# Patient Record
Sex: FEMALE | Race: WHITE | NOT HISPANIC OR LATINO | URBAN - METROPOLITAN AREA
[De-identification: names, ages, dates, MRNs, and addresses within clinical notes are randomized per-mention and may not be internally consistent; named-entity substitution may affect disease eponyms.]

---

## 2024-01-02 ENCOUNTER — OFFICE VISIT (OUTPATIENT)
Dept: URBAN - METROPOLITAN AREA CLINIC 54 | Facility: CLINIC | Age: 34
End: 2024-01-02
Payer: COMMERCIAL

## 2024-01-02 VITALS
BODY MASS INDEX: 27.83 KG/M2 | HEART RATE: 90 BPM | TEMPERATURE: 98.4 F | WEIGHT: 163 LBS | SYSTOLIC BLOOD PRESSURE: 114 MMHG | HEIGHT: 64 IN | DIASTOLIC BLOOD PRESSURE: 80 MMHG

## 2024-01-02 DIAGNOSIS — K70.10 ALCOHOLIC HEPATITIS WITHOUT ASCITES: ICD-10-CM

## 2024-01-02 DIAGNOSIS — R79.89 ELEVATED LFTS: ICD-10-CM

## 2024-01-02 PROBLEM — 235875008: Status: ACTIVE | Noted: 2024-01-02

## 2024-01-02 PROCEDURE — 99205 OFFICE O/P NEW HI 60 MIN: CPT | Performed by: PHYSICIAN ASSISTANT

## 2024-01-02 PROCEDURE — 99245 OFF/OP CONSLTJ NEW/EST HI 55: CPT | Performed by: PHYSICIAN ASSISTANT

## 2024-01-02 RX ORDER — FOLIC ACID 1 MG/1
1 TABLET TABLET ORAL ONCE A DAY
Status: ACTIVE | COMMUNITY

## 2024-01-02 RX ORDER — SERTRALINE 50 MG/1
1 TABLET TABLET, FILM COATED ORAL ONCE A DAY
Status: ACTIVE | COMMUNITY

## 2024-01-02 RX ORDER — GABAPENTIN 300 MG/1
1 CAPSULE CAPSULE ORAL ONCE A DAY
Status: ACTIVE | COMMUNITY

## 2024-01-02 NOTE — PHYSICAL EXAM GASTROINTESTINAL
Abdomen , soft, nontender, nondistended , no guarding or rigidity , no masses palpable ,  enlarged liver

## 2024-01-02 NOTE — HPI-TODAY'S VISIT:
Mayra Wolfe is a 33-year-old female with PMH of who was referred to clinic by JEROME Dickey for elevated LFTs.  A copy of this document will be sent to the referring provider.  Patient was noted to have elevated LFTs on 10/26/2023 when she was seen at urgent care and was referred to the ED.  She was kept in [facility] for call detox. LFTs at that time were , ALT 49, T. bili 1.90, alk phos 152.  Other labs included low folate.  RUQ US obtained on 12/20/2023 heterogeneous and coarsened liver parenchyma which may represent underlying chronic liver disease.  No intra or extrahepatic ductal dilation.  No evidence of acute cholecystitis. LFTs were not repeated during hospitalization or at follow-up.  Patient was previously drinking of a 750 mL bottle daily and has reportedly cut back to 10 oz daily and has been decreasing amount by 2 oz each week. Hepatitis panel was negative. No previous EGD or colonoscopy. Folate was recently noted to be low and on supplementation. Normal B12. She reports she was initially seen in the hospital for paresthesias. Follows with Dr Hobson who is considering GBS as a cause for neuropathy as there was a prodrome illness.

## 2024-01-03 LAB
A/G RATIO: 0.8
ABSOLUTE BASOPHILS: 50
ABSOLUTE EOSINOPHILS: 108
ABSOLUTE LYMPHOCYTES: 2117
ABSOLUTE MONOCYTES: 540
ABSOLUTE NEUTROPHILS: 5486
ALBUMIN: 3.4
ALKALINE PHOSPHATASE: 152
ALT (SGPT): 52
AST (SGOT): 239
BASOPHILS: 0.6
BILIRUBIN, TOTAL: 2.7
BUN/CREATININE RATIO: 14
BUN: 5
CALCIUM: 8.9
CARBON DIOXIDE, TOTAL: 26
CHLORIDE: 100
CREATININE: 0.37
EGFR: 136
EOSINOPHILS: 1.3
GLOBULIN, TOTAL: 4.3
GLUCOSE: 95
HEMATOCRIT: 38.3
HEMOGLOBIN: 13.9
INR: 1.5
LYMPHOCYTES: 25.5
MCH: 33
MCHC: 36.3
MCV: 91
MONOCYTES: 6.5
MPV: 12.2
NEUTROPHILS: 66.1
PLATELET COUNT: 114
POTASSIUM: 3.6
PROTEIN, TOTAL: 7.7
PT: 15.8
RDW: 13.1
RED BLOOD CELL COUNT: 4.21
SODIUM: 138
WHITE BLOOD CELL COUNT: 8.3

## 2024-03-06 ENCOUNTER — OV EP (OUTPATIENT)
Dept: URBAN - METROPOLITAN AREA CLINIC 54 | Facility: CLINIC | Age: 34
End: 2024-03-06

## 2024-03-06 RX ORDER — SERTRALINE 50 MG/1
1 TABLET TABLET, FILM COATED ORAL ONCE A DAY
COMMUNITY

## 2024-03-06 RX ORDER — FOLIC ACID 1 MG/1
1 TABLET TABLET ORAL ONCE A DAY
COMMUNITY

## 2024-03-06 RX ORDER — GABAPENTIN 300 MG/1
1 CAPSULE CAPSULE ORAL ONCE A DAY
COMMUNITY

## 2024-03-06 RX ORDER — PROPRANOLOL HYDROCHLORIDE 20 MG/1
1 TABLET TABLET ORAL ONCE A DAY
Status: ACTIVE | COMMUNITY

## 2024-03-06 RX ORDER — ESCITALOPRAM OXALATE 5 MG/1
1 TABLET TABLET, FILM COATED ORAL ONCE A DAY
Status: ACTIVE | COMMUNITY

## 2024-03-07 ENCOUNTER — OV EP (OUTPATIENT)
Dept: URBAN - METROPOLITAN AREA CLINIC 54 | Facility: CLINIC | Age: 34
End: 2024-03-07

## 2024-03-07 RX ORDER — SERTRALINE 50 MG/1
1 TABLET TABLET, FILM COATED ORAL ONCE A DAY
COMMUNITY

## 2024-03-07 RX ORDER — GABAPENTIN 300 MG/1
1 CAPSULE CAPSULE ORAL ONCE A DAY
COMMUNITY

## 2024-03-07 RX ORDER — FOLIC ACID 1 MG/1
1 TABLET TABLET ORAL ONCE A DAY
COMMUNITY

## 2024-03-13 ENCOUNTER — OV EP (OUTPATIENT)
Dept: URBAN - METROPOLITAN AREA CLINIC 54 | Facility: CLINIC | Age: 34
End: 2024-03-13

## 2024-03-13 RX ORDER — FOLIC ACID 1 MG/1
1 TABLET TABLET ORAL ONCE A DAY
COMMUNITY

## 2024-03-13 RX ORDER — SERTRALINE 50 MG/1
1 TABLET TABLET, FILM COATED ORAL ONCE A DAY
COMMUNITY

## 2024-03-13 RX ORDER — ESCITALOPRAM OXALATE 5 MG/1
1 TABLET TABLET, FILM COATED ORAL ONCE A DAY
Status: ACTIVE | COMMUNITY

## 2024-03-13 RX ORDER — GABAPENTIN 300 MG/1
1 CAPSULE CAPSULE ORAL ONCE A DAY
COMMUNITY

## 2024-03-13 RX ORDER — PROPRANOLOL HYDROCHLORIDE 20 MG/1
1 TABLET TABLET ORAL ONCE A DAY
Status: ACTIVE | COMMUNITY

## 2024-06-27 ENCOUNTER — OFFICE VISIT (OUTPATIENT)
Dept: URBAN - METROPOLITAN AREA CLINIC 54 | Facility: CLINIC | Age: 34
End: 2024-06-27
Payer: COMMERCIAL

## 2024-06-27 VITALS
HEART RATE: 79 BPM | SYSTOLIC BLOOD PRESSURE: 121 MMHG | HEIGHT: 64 IN | TEMPERATURE: 98.1 F | BODY MASS INDEX: 25.95 KG/M2 | WEIGHT: 152 LBS | DIASTOLIC BLOOD PRESSURE: 82 MMHG

## 2024-06-27 DIAGNOSIS — F10.10 ALCOHOL ABUSE: ICD-10-CM

## 2024-06-27 DIAGNOSIS — Z86.2: ICD-10-CM

## 2024-06-27 DIAGNOSIS — K70.31 ALCOHOLIC CIRRHOSIS OF LIVER WITH ASCITES: ICD-10-CM

## 2024-06-27 DIAGNOSIS — D69.6 THROMBOCYTOPENIA: ICD-10-CM

## 2024-06-27 DIAGNOSIS — D62 ACUTE BLOOD LOSS ANEMIA: ICD-10-CM

## 2024-06-27 PROBLEM — 420054005: Status: ACTIVE | Noted: 2024-06-27

## 2024-06-27 PROCEDURE — 99215 OFFICE O/P EST HI 40 MIN: CPT

## 2024-06-27 RX ORDER — MIDODRINE HYDROCHLORIDE 10 MG/1
1 TABLET TABLET ORAL TWICE A DAY
Status: ACTIVE | COMMUNITY

## 2024-06-27 RX ORDER — PANTOPRAZOLE SODIUM 20 MG/1
1 TABLET TABLET, DELAYED RELEASE ORAL ONCE A DAY
Status: ACTIVE | COMMUNITY

## 2024-06-27 RX ORDER — FUROSEMIDE 40 MG/1
1 TABLET TABLET ORAL ONCE A DAY
Qty: 30 | Refills: 1 | OUTPATIENT
Start: 2024-06-28 | End: 2024-08-27

## 2024-06-27 RX ORDER — LACTULOSE 10 G/15ML
15 ML AS NEEDED SOLUTION ORAL TWICE A DAY
Qty: 900 ML | Refills: 3 | OUTPATIENT
Start: 2024-06-28 | End: 2024-10-26

## 2024-06-27 RX ORDER — GABAPENTIN 300 MG/1
1 CAPSULE CAPSULE ORAL ONCE A DAY
Status: ACTIVE | COMMUNITY

## 2024-06-27 RX ORDER — FLUOXETINE HYDROCHLORIDE 10 MG/1
1 CAPSULE CAPSULE ORAL ONCE A DAY
Status: ACTIVE | COMMUNITY

## 2024-06-27 RX ORDER — SPIRONOLACTONE 100 MG/1
1 TABLET TABLET, FILM COATED ORAL ONCE A DAY
Qty: 30 | Refills: 1 | OUTPATIENT
Start: 2024-06-28 | End: 2024-08-27

## 2024-06-27 RX ORDER — BUSPIRONE HYDROCHLORIDE 10 MG/1
1 TABLET TABLET ORAL TWICE A DAY
Status: ACTIVE | COMMUNITY

## 2024-06-27 NOTE — HPI-TODAY'S VISIT:
Mayra Wolef is a 33-year-old female with PMH of who was referred to clinic by JEROME Dickey for elevated LFTs.  A copy of this document will be sent to the referring provider.  Patient was noted to have elevated LFTs on 10/26/2023 when she was seen at urgent care and was referred to the ED.  She was kept in [facility] for call detox. LFTs at that time were , ALT 49, T. bili 1.90, alk phos 152.  Other labs included low folate.  RUQ US obtained on 12/20/2023 heterogeneous and coarsened liver parenchyma which may represent underlying chronic liver disease.  No intra or extrahepatic ductal dilation.  No evidence of acute cholecystitis. LFTs were not repeated during hospitalization or at follow-up.  Patient was previously drinking of a 750 mL bottle daily and has reportedly cut back to 10 oz daily and has been decreasing amount by 2 oz each week. Hepatitis panel was negative. No previous EGD or colonoscopy. Folate was recently noted to be low and on supplementation. Normal B12. She reports she was initially seen in the hospital for paresthesias. Follows with Dr Hobson who is considering GBS as a cause for neuropathy as there was a prodrome illness.   6/27/24 Follow Up: Pt returns for follow up with newly decompensated etoh cirrhosis. Pt was in [place] attending an EtOH rehab program starting on 6/10 when she developed menorrhagia, epistaxis, and gingival bleeding. Hospitalized at [facility] from 6/14 - 6/22 with acute blood loss anemia, DIC, and decomensated cirrhosis. Labs on admission: Hgb 6.9, MCV 98.1, , ALT 40, , TB 3.8, Alb 3.0, Cr 0.58, Na 133, Plt 54, INR 2.0 (MELD 3.0 of 23). Required 7 units PRBC, 5 units FFP, Vit K x3. S/p 2 paracenteses negative for SBP during this admission (8.5L on 6/17, 4.7L on 6/20). GI was consulted who did not recommend steroid or EGD. Started midodrine for hypotension, could not use diuretics. Pt returned on 6/23 - 6/24, recurrent ascites requiring another paracentesis (3.8L on 6/24). Has returned to [place] for ongoing medical mgmt.   Today pt complains of recurrent ascites causing discomfort and SOB. Still not on diuretics. Bleeding has resolved, denies any hematochezia, melena, or hematemesis. Admits to more confusion/forgetfulness than usual. Not on lactulose or xifaxan. Previous jaundice has resolved. Last drink was 6/10/24 and pt reports having no interest in drinking.  Labs 6/23/24: , ALT 25, , TB 2.3, Alb 3.1, Cr 0.55, Na 139, Plt 120, INR 1.4 (MELD 3.0 is 15)  CT abd/pelvis WO 6/23/24: - Moderate ascites abdomen and pelvis.  - Distended gallbladder without calculi or biliary ductal dilatation.  - Suspect cirrhosis with splenomegaly; no hepatic masses are seen.

## 2024-06-27 NOTE — PHYSICAL EXAM GASTROINTESTINAL
Abdomen , soft, nontender, distended with ascites, no rebound or guarding, no masses palpable, Liver , no hepatomegaly present, liver nontender

## 2024-06-28 ENCOUNTER — LAB OUTSIDE AN ENCOUNTER (OUTPATIENT)
Dept: URBAN - METROPOLITAN AREA CLINIC 54 | Facility: CLINIC | Age: 34
End: 2024-06-28

## 2024-06-28 ENCOUNTER — TELEPHONE ENCOUNTER (OUTPATIENT)
Dept: URBAN - METROPOLITAN AREA CLINIC 54 | Facility: CLINIC | Age: 34
End: 2024-06-28

## 2024-06-29 ENCOUNTER — DASHBOARD ENCOUNTERS (OUTPATIENT)
Age: 34
End: 2024-06-29

## 2024-06-29 ENCOUNTER — LAB OUTSIDE AN ENCOUNTER (OUTPATIENT)
Dept: URBAN - METROPOLITAN AREA CLINIC 54 | Facility: CLINIC | Age: 34
End: 2024-06-29

## 2024-06-29 ENCOUNTER — TELEPHONE ENCOUNTER (OUTPATIENT)
Dept: URBAN - METROPOLITAN AREA CLINIC 54 | Facility: CLINIC | Age: 34
End: 2024-06-29

## 2024-06-29 PROBLEM — 15167005: Status: ACTIVE | Noted: 2024-06-29

## 2024-06-29 PROBLEM — 302215000: Status: ACTIVE | Noted: 2024-06-29

## 2024-07-01 ENCOUNTER — CLAIMS CREATED FROM THE CLAIM WINDOW (OUTPATIENT)
Dept: URBAN - METROPOLITAN AREA MEDICAL CENTER 27 | Facility: MEDICAL CENTER | Age: 34
End: 2024-07-01
Payer: COMMERCIAL

## 2024-07-01 DIAGNOSIS — K92.1 ACUTE MELENA: ICD-10-CM

## 2024-07-01 DIAGNOSIS — K70.30 ALC (ALCOHOLIC LIVER CIRRHOSIS): ICD-10-CM

## 2024-07-01 PROCEDURE — 99254 IP/OBS CNSLTJ NEW/EST MOD 60: CPT | Performed by: INTERNAL MEDICINE

## 2024-07-01 PROCEDURE — 99214 OFFICE O/P EST MOD 30 MIN: CPT | Performed by: INTERNAL MEDICINE

## 2024-07-02 ENCOUNTER — LAB OUTSIDE AN ENCOUNTER (OUTPATIENT)
Dept: URBAN - METROPOLITAN AREA CLINIC 23 | Facility: CLINIC | Age: 34
End: 2024-07-02

## 2024-07-02 ENCOUNTER — CLAIMS CREATED FROM THE CLAIM WINDOW (OUTPATIENT)
Dept: URBAN - METROPOLITAN AREA MEDICAL CENTER 27 | Facility: MEDICAL CENTER | Age: 34
End: 2024-07-02
Payer: COMMERCIAL

## 2024-07-02 ENCOUNTER — TELEPHONE ENCOUNTER (OUTPATIENT)
Dept: URBAN - METROPOLITAN AREA CLINIC 54 | Facility: CLINIC | Age: 34
End: 2024-07-02

## 2024-07-02 DIAGNOSIS — K70.30 ALC (ALCOHOLIC LIVER CIRRHOSIS): ICD-10-CM

## 2024-07-02 DIAGNOSIS — K76.6 CLINICALLY SIGNIFICANT PORTAL HYPERTENSION: ICD-10-CM

## 2024-07-02 DIAGNOSIS — K31.89 OTHER DISEASES OF STOMACH AND DUODENUM: ICD-10-CM

## 2024-07-02 DIAGNOSIS — K92.1 ACUTE MELENA: ICD-10-CM

## 2024-07-02 DIAGNOSIS — D62 ABLA (ACUTE BLOOD LOSS ANEMIA): ICD-10-CM

## 2024-07-02 DIAGNOSIS — I85.10 ESOPH VARICE OTHER DIS: ICD-10-CM

## 2024-07-02 LAB
ALB BF TYPE: (no result)
ALBUMIN BF: 0.4
BODY FLUID APPEARANCE: CLEAR
BODY FLUID COLOR: YELLOW
BODY FLUID DIFF:: (no result)
BODY FLUID LYMPH: 44
BODY FLUID MESO: 9
BODY FLUID MONO/MACRO: 38
BODY FLUID NEUT: 9
BODY FLUID NUCLEATED CELLS: 173
BODY FLUID RBC: <2000
BODY FLUID TOTAL CELLS COUNTED: 100
BODY FLUID TYPE: (no result)
PERFORMING LAB: (no result)
PROT BF TYPE: (no result)
PROTEIN BF: 0.8

## 2024-07-02 PROCEDURE — 45378 DIAGNOSTIC COLONOSCOPY: CPT | Performed by: INTERNAL MEDICINE

## 2024-07-02 PROCEDURE — 45380 COLONOSCOPY AND BIOPSY: CPT | Performed by: INTERNAL MEDICINE

## 2024-07-02 PROCEDURE — 43244 EGD VARICES LIGATION: CPT | Performed by: INTERNAL MEDICINE

## 2024-07-02 PROCEDURE — 43235 EGD DIAGNOSTIC BRUSH WASH: CPT | Performed by: INTERNAL MEDICINE

## 2024-07-08 ENCOUNTER — TELEPHONE ENCOUNTER (OUTPATIENT)
Dept: URBAN - METROPOLITAN AREA CLINIC 54 | Facility: CLINIC | Age: 34
End: 2024-07-08

## 2024-07-08 ENCOUNTER — OFFICE VISIT (OUTPATIENT)
Dept: URBAN - METROPOLITAN AREA CLINIC 54 | Facility: CLINIC | Age: 34
End: 2024-07-08
Payer: COMMERCIAL

## 2024-07-08 ENCOUNTER — OFFICE VISIT (OUTPATIENT)
Dept: URBAN - METROPOLITAN AREA CLINIC 54 | Facility: CLINIC | Age: 34
End: 2024-07-08

## 2024-07-08 VITALS
SYSTOLIC BLOOD PRESSURE: 102 MMHG | DIASTOLIC BLOOD PRESSURE: 71 MMHG | HEIGHT: 64 IN | HEART RATE: 65 BPM | BODY MASS INDEX: 23.39 KG/M2 | TEMPERATURE: 98 F | WEIGHT: 137 LBS

## 2024-07-08 DIAGNOSIS — K70.31 ALCOHOLIC CIRRHOSIS OF LIVER WITH ASCITES: ICD-10-CM

## 2024-07-08 DIAGNOSIS — K76.6 PORTAL HYPERTENSION: ICD-10-CM

## 2024-07-08 DIAGNOSIS — D69.6 THROMBOCYTOPENIA: ICD-10-CM

## 2024-07-08 DIAGNOSIS — Z86.2: ICD-10-CM

## 2024-07-08 DIAGNOSIS — I85.11 SECONDARY ESOPHAGEAL VARICES WITH BLEEDING: ICD-10-CM

## 2024-07-08 DIAGNOSIS — F10.10 ALCOHOL ABUSE: ICD-10-CM

## 2024-07-08 DIAGNOSIS — D62 ACUTE BLOOD LOSS ANEMIA: ICD-10-CM

## 2024-07-08 PROBLEM — 34742003: Status: ACTIVE | Noted: 2024-07-08

## 2024-07-08 PROBLEM — 17709002: Status: ACTIVE | Noted: 2024-07-08

## 2024-07-08 PROCEDURE — 99214 OFFICE O/P EST MOD 30 MIN: CPT | Performed by: INTERNAL MEDICINE

## 2024-07-08 RX ORDER — SPIRONOLACTONE 100 MG/1
1 TABLET TABLET, FILM COATED ORAL ONCE A DAY
Qty: 30 | Refills: 1 | Status: ACTIVE | COMMUNITY
Start: 2024-06-28 | End: 2024-08-27

## 2024-07-08 RX ORDER — GABAPENTIN 300 MG/1
1 CAPSULE CAPSULE ORAL ONCE A DAY
Status: ACTIVE | COMMUNITY

## 2024-07-08 RX ORDER — FUROSEMIDE 40 MG/1
1 TABLET TABLET ORAL ONCE A DAY
Qty: 30 | Refills: 1 | Status: ACTIVE | COMMUNITY
Start: 2024-06-28 | End: 2024-08-27

## 2024-07-08 RX ORDER — LACTULOSE 10 G/15ML
15 ML AS NEEDED SOLUTION ORAL TWICE A DAY
Qty: 900 ML | Refills: 3 | OUTPATIENT

## 2024-07-08 RX ORDER — PANTOPRAZOLE SODIUM 20 MG/1
1 TABLET TABLET, DELAYED RELEASE ORAL ONCE A DAY
Status: ACTIVE | COMMUNITY

## 2024-07-08 RX ORDER — SPIRONOLACTONE 100 MG/1
1 TABLET TABLET, FILM COATED ORAL ONCE A DAY
Qty: 30 | Refills: 1 | OUTPATIENT

## 2024-07-08 RX ORDER — MIDODRINE HYDROCHLORIDE 10 MG/1
1 TABLET TABLET ORAL TWICE A DAY
Status: ACTIVE | COMMUNITY

## 2024-07-08 RX ORDER — FLUOXETINE HYDROCHLORIDE 10 MG/1
1 CAPSULE CAPSULE ORAL ONCE A DAY
Status: ACTIVE | COMMUNITY

## 2024-07-08 RX ORDER — LACTULOSE 10 G/15ML
15 ML AS NEEDED SOLUTION ORAL TWICE A DAY
Qty: 900 ML | Refills: 3 | Status: ACTIVE | COMMUNITY
Start: 2024-06-28 | End: 2024-10-26

## 2024-07-08 RX ORDER — CARVEDILOL 3.12 MG/1
1 TABLET WITH FOOD TABLET, FILM COATED ORAL TWICE A DAY
Qty: 60 | Refills: 3 | OUTPATIENT
Start: 2024-07-08

## 2024-07-08 RX ORDER — BUSPIRONE HYDROCHLORIDE 10 MG/1
1 TABLET TABLET ORAL TWICE A DAY
Status: ACTIVE | COMMUNITY

## 2024-07-08 RX ORDER — FUROSEMIDE 40 MG/1
1 TABLET TABLET ORAL ONCE A DAY
Qty: 30 | Refills: 1 | OUTPATIENT

## 2024-07-08 NOTE — HPI-TODAY'S VISIT:
Mayra Wolfe is a 33-year-old female with PMH of who was referred to clinic by JEROME Dickey for elevated LFTs.  A copy of this document will be sent to the referring provider.  Patient was noted to have elevated LFTs on 10/26/2023 when she was seen at urgent care and was referred to the ED.  She was kept in [facility] for call detox. LFTs at that time were , ALT 49, T. bili 1.90, alk phos 152.  Other labs included low folate.  RUQ US obtained on 12/20/2023 heterogeneous and coarsened liver parenchyma which may represent underlying chronic liver disease.  No intra or extrahepatic ductal dilation.  No evidence of acute cholecystitis. LFTs were not repeated during hospitalization or at follow-up.  Patient was previously drinking of a 750 mL bottle daily and has reportedly cut back to 10 oz daily and has been decreasing amount by 2 oz each week. Hepatitis panel was negative. No previous EGD or colonoscopy. Folate was recently noted to be low and on supplementation. Normal B12. She reports she was initially seen in the hospital for paresthesias. Follows with Dr Hobson who is considering GBS as a cause for neuropathy as there was a prodrome illness.   6/27/24 Follow Up: Pt returns for follow up with newly decompensated etoh cirrhosis. Pt was in [place] attending an EtOH rehab program starting on 6/10 when she developed menorrhagia, epistaxis, and gingival bleeding. Hospitalized at [facility] from 6/14 - 6/22 with acute blood loss anemia, DIC, and decomensated cirrhosis. Labs on admission: Hgb 6.9, MCV 98.1, , ALT 40, , TB 3.8, Alb 3.0, Cr 0.58, Na 133, Plt 54, INR 2.0 (MELD 3.0 of 23). Required 7 units PRBC, 5 units FFP, Vit K x3. S/p 2 paracenteses negative for SBP during this admission (8.5L on 6/17, 4.7L on 6/20). GI was consulted who did not recommend steroid or EGD. Started midodrine for hypotension, could not use diuretics. Pt returned on 6/23 - 6/24, recurrent ascites requiring another paracentesis (3.8L on 6/24). Has returned to [place] for ongoing medical mgmt.   Today pt complains of recurrent ascites causing discomfort and SOB. Still not on diuretics. Bleeding has resolved, denies any hematochezia, melena, or hematemesis. Admits to more confusion/forgetfulness than usual. Not on lactulose or xifaxan. Previous jaundice has resolved. Last drink was 6/10/24 and pt reports having no interest in drinking.  Labs 6/23/24: , ALT 25, , TB 2.3, Alb 3.1, Cr 0.55, Na 139, Plt 120, INR 1.4 (MELD 3.0 is 15)  CT abd/pelvis WO 6/23/24: - Moderate ascites abdomen and pelvis.  - Distended gallbladder without calculi or biliary ductal dilatation.  - Suspect cirrhosis with splenomegaly; no hepatic masses are seen.  Follow Up 7/8/24: Patient presents for post hospitazation follow up. She ended up at [facility] for UGIB. EGD revealed large varices with HRS s/p EBL x 6 and moderate PHG. Colon revealed rectal varices. She had LVP fo 2.8 L with no infection. She feels better. She c/o chest pain since the EGD. Her last drink of alcohol was 6/10/24. She is non-smoker and is living with her parents. She works at [employer] but currently short term disability.

## 2024-07-10 ENCOUNTER — TELEPHONE ENCOUNTER (OUTPATIENT)
Dept: URBAN - METROPOLITAN AREA CLINIC 54 | Facility: CLINIC | Age: 34
End: 2024-07-10

## 2024-07-16 ENCOUNTER — TELEPHONE ENCOUNTER (OUTPATIENT)
Dept: URBAN - METROPOLITAN AREA CLINIC 23 | Facility: CLINIC | Age: 34
End: 2024-07-16

## 2024-07-25 ENCOUNTER — ERX REFILL RESPONSE (OUTPATIENT)
Dept: URBAN - METROPOLITAN AREA CLINIC 54 | Facility: CLINIC | Age: 34
End: 2024-07-25

## 2024-07-25 RX ORDER — FUROSEMIDE 40 MG/1
1 TABLET TABLET ORAL ONCE A DAY
Qty: 30 | Refills: 1 | OUTPATIENT

## 2024-07-25 RX ORDER — SPIRONOLACTONE 100 MG/1
TAKE 1 TABLET BY MOUTH EVERY DAY FOR 30 DAYS TABLET ORAL
Qty: 90 TABLET | Refills: 1 | OUTPATIENT

## 2024-07-25 RX ORDER — SPIRONOLACTONE 100 MG/1
1 TABLET TABLET, FILM COATED ORAL ONCE A DAY
Qty: 30 | Refills: 1 | OUTPATIENT

## 2024-07-25 RX ORDER — FUROSEMIDE 40 MG/1
TAKE 1 TABLET BY MOUTH EVERY DAY FOR 30 DAYS TABLET ORAL
Qty: 90 TABLET | Refills: 1 | OUTPATIENT

## 2024-08-09 ENCOUNTER — OFFICE VISIT (OUTPATIENT)
Dept: URBAN - METROPOLITAN AREA CLINIC 54 | Facility: CLINIC | Age: 34
End: 2024-08-09

## 2024-08-12 ENCOUNTER — OFFICE VISIT (OUTPATIENT)
Dept: URBAN - METROPOLITAN AREA CLINIC 54 | Facility: CLINIC | Age: 34
End: 2024-08-12

## 2024-08-12 RX ORDER — LACTULOSE 10 G/15ML
15 ML AS NEEDED SOLUTION ORAL TWICE A DAY
Qty: 900 ML | Refills: 3 | COMMUNITY

## 2024-08-12 RX ORDER — FUROSEMIDE 40 MG/1
TAKE 1 TABLET BY MOUTH EVERY DAY FOR 30 DAYS TABLET ORAL
Qty: 90 TABLET | Refills: 1 | COMMUNITY

## 2024-08-12 RX ORDER — SPIRONOLACTONE 100 MG/1
TAKE 1 TABLET BY MOUTH EVERY DAY FOR 30 DAYS TABLET ORAL
Qty: 90 TABLET | Refills: 1 | COMMUNITY

## 2024-08-12 RX ORDER — FLUOXETINE HYDROCHLORIDE 10 MG/1
1 CAPSULE CAPSULE ORAL ONCE A DAY
COMMUNITY

## 2024-08-12 RX ORDER — MIDODRINE HYDROCHLORIDE 10 MG/1
1 TABLET TABLET ORAL TWICE A DAY
COMMUNITY

## 2024-08-12 RX ORDER — CARVEDILOL 3.12 MG/1
1 TABLET WITH FOOD TABLET, FILM COATED ORAL TWICE A DAY
Qty: 60 | Refills: 3 | COMMUNITY
Start: 2024-07-08

## 2024-08-12 RX ORDER — BUSPIRONE HYDROCHLORIDE 10 MG/1
1 TABLET TABLET ORAL TWICE A DAY
COMMUNITY

## 2024-08-12 RX ORDER — PANTOPRAZOLE SODIUM 20 MG/1
1 TABLET TABLET, DELAYED RELEASE ORAL ONCE A DAY
COMMUNITY

## 2024-08-12 RX ORDER — GABAPENTIN 300 MG/1
1 CAPSULE CAPSULE ORAL ONCE A DAY
COMMUNITY

## 2024-08-13 ENCOUNTER — ERX REFILL RESPONSE (OUTPATIENT)
Dept: URBAN - METROPOLITAN AREA CLINIC 54 | Facility: CLINIC | Age: 34
End: 2024-08-13

## 2024-08-13 RX ORDER — CARVEDILOL 3.12 MG/1
1 TABLET WITH FOOD TABLET, FILM COATED ORAL TWICE A DAY
Qty: 60 | Refills: 3 | OUTPATIENT

## 2024-08-13 RX ORDER — CARVEDILOL 3.12 MG/1
TAKE 1 TABLET BY MOUTH TWICE A DAY WITH FOOD FOR 30 DAYS TABLET, FILM COATED ORAL
Qty: 180 TABLET | Refills: 1 | OUTPATIENT

## 2024-08-20 ENCOUNTER — P2P PATIENT RECORD (OUTPATIENT)
Age: 34
End: 2024-08-20

## 2024-09-04 ENCOUNTER — TELEPHONE ENCOUNTER (OUTPATIENT)
Dept: URBAN - METROPOLITAN AREA CLINIC 54 | Facility: CLINIC | Age: 34
End: 2024-09-04

## 2024-09-13 ENCOUNTER — OFFICE VISIT (OUTPATIENT)
Dept: URBAN - METROPOLITAN AREA CLINIC 54 | Facility: CLINIC | Age: 34
End: 2024-09-13

## 2024-09-13 RX ORDER — FUROSEMIDE 40 MG/1
TAKE 1 TABLET BY MOUTH EVERY DAY FOR 30 DAYS TABLET ORAL
Qty: 90 TABLET | Refills: 1 | Status: ACTIVE | COMMUNITY

## 2024-09-13 RX ORDER — FLUOXETINE HYDROCHLORIDE 20 MG/1
1 CAPSULE CAPSULE ORAL ONCE A DAY
Status: ACTIVE | COMMUNITY

## 2024-09-13 RX ORDER — BUSPIRONE HYDROCHLORIDE 10 MG/1
1 TABLET TABLET ORAL TWICE A DAY
Status: ACTIVE | COMMUNITY

## 2024-09-13 RX ORDER — MIDODRINE HYDROCHLORIDE 10 MG/1
1 TABLET TABLET ORAL TWICE A DAY
Status: ACTIVE | COMMUNITY

## 2024-09-13 RX ORDER — PROPRANOLOL HYDROCHLORIDE 20 MG/1
1 TABLET TABLET ORAL TWICE A DAY
Status: ACTIVE | COMMUNITY

## 2024-09-13 RX ORDER — POTASSIUM CHLORIDE 750 MG/1
1 TABLET WITH FOOD TABLET, EXTENDED RELEASE ORAL TWICE A DAY
Status: ACTIVE | COMMUNITY

## 2024-09-13 RX ORDER — LACTULOSE 10 G/15ML
15 ML AS NEEDED SOLUTION ORAL TWICE A DAY
Qty: 900 ML | Refills: 3 | Status: ACTIVE | COMMUNITY

## 2024-09-13 RX ORDER — GABAPENTIN 300 MG/1
1 CAPSULE CAPSULE ORAL ONCE A DAY
Status: ACTIVE | COMMUNITY

## 2024-09-13 RX ORDER — CARVEDILOL 3.12 MG/1
TAKE 1 TABLET BY MOUTH TWICE A DAY WITH FOOD FOR 30 DAYS TABLET, FILM COATED ORAL
Qty: 180 TABLET | Refills: 1 | Status: ACTIVE | COMMUNITY

## 2024-09-13 RX ORDER — PANTOPRAZOLE SODIUM 20 MG/1
1 TABLET TABLET, DELAYED RELEASE ORAL ONCE A DAY
COMMUNITY

## 2024-09-13 RX ORDER — SPIRONOLACTONE 100 MG/1
TAKE 1 TABLET BY MOUTH EVERY DAY FOR 30 DAYS TABLET ORAL
Qty: 90 TABLET | Refills: 1 | COMMUNITY

## 2024-09-18 ENCOUNTER — OFFICE VISIT (OUTPATIENT)
Dept: URBAN - METROPOLITAN AREA CLINIC 54 | Facility: CLINIC | Age: 34
End: 2024-09-18

## 2024-09-25 ENCOUNTER — TELEPHONE ENCOUNTER (OUTPATIENT)
Dept: URBAN - METROPOLITAN AREA CLINIC 54 | Facility: CLINIC | Age: 34
End: 2024-09-25

## 2024-09-25 ENCOUNTER — OFFICE VISIT (OUTPATIENT)
Dept: URBAN - METROPOLITAN AREA MEDICAL CENTER 24 | Facility: MEDICAL CENTER | Age: 34
End: 2024-09-25

## 2024-09-25 RX ORDER — PROPRANOLOL HYDROCHLORIDE 20 MG/1
1 TABLET TABLET ORAL TWICE A DAY
Status: ACTIVE | COMMUNITY

## 2024-09-25 RX ORDER — CARVEDILOL 3.12 MG/1
TAKE 1 TABLET BY MOUTH TWICE A DAY WITH FOOD FOR 30 DAYS TABLET, FILM COATED ORAL
Qty: 180 TABLET | Refills: 1 | Status: ACTIVE | COMMUNITY

## 2024-09-25 RX ORDER — POTASSIUM CHLORIDE 750 MG/1
1 TABLET WITH FOOD TABLET, EXTENDED RELEASE ORAL TWICE A DAY
Status: ACTIVE | COMMUNITY

## 2024-09-25 RX ORDER — MIDODRINE HYDROCHLORIDE 10 MG/1
1 TABLET TABLET ORAL TWICE A DAY
Status: ACTIVE | COMMUNITY

## 2024-09-25 RX ORDER — LACTULOSE 10 G/15ML
15 ML AS NEEDED SOLUTION ORAL TWICE A DAY
Qty: 900 ML | Refills: 3 | Status: ACTIVE | COMMUNITY

## 2024-09-25 RX ORDER — PANTOPRAZOLE SODIUM 20 MG/1
1 TABLET TABLET, DELAYED RELEASE ORAL ONCE A DAY
COMMUNITY

## 2024-09-25 RX ORDER — FUROSEMIDE 40 MG/1
TAKE 1 TABLET BY MOUTH EVERY DAY FOR 30 DAYS TABLET ORAL
Qty: 90 TABLET | Refills: 1 | Status: ACTIVE | COMMUNITY

## 2024-09-25 RX ORDER — BUSPIRONE HYDROCHLORIDE 10 MG/1
1 TABLET TABLET ORAL TWICE A DAY
Status: ACTIVE | COMMUNITY

## 2024-09-25 RX ORDER — FLUOXETINE HYDROCHLORIDE 20 MG/1
1 CAPSULE CAPSULE ORAL ONCE A DAY
Status: ACTIVE | COMMUNITY

## 2024-09-25 RX ORDER — NALTREXONE HYDROCHLORIDE 50 MG/1
1 TABLET TABLET, FILM COATED ORAL ONCE A DAY
Qty: 30 | Refills: 1 | OUTPATIENT
Start: 2024-09-25 | End: 2024-11-23

## 2024-09-25 RX ORDER — GABAPENTIN 300 MG/1
1 CAPSULE CAPSULE ORAL ONCE A DAY
Status: ACTIVE | COMMUNITY

## 2024-09-25 RX ORDER — PROPRANOLOL HYDROCHLORIDE 20 MG/1
1 TABLET TABLET ORAL TWICE A DAY
OUTPATIENT

## 2024-09-25 RX ORDER — SPIRONOLACTONE 100 MG/1
TAKE 1 TABLET BY MOUTH EVERY DAY FOR 30 DAYS TABLET ORAL
Qty: 90 TABLET | Refills: 1 | COMMUNITY

## 2024-10-09 ENCOUNTER — OFFICE VISIT (OUTPATIENT)
Dept: URBAN - METROPOLITAN AREA CLINIC 54 | Facility: CLINIC | Age: 34
End: 2024-10-09

## 2024-10-09 RX ORDER — PANTOPRAZOLE SODIUM 20 MG/1
1 TABLET TABLET, DELAYED RELEASE ORAL ONCE A DAY
COMMUNITY

## 2024-10-09 RX ORDER — FLUOXETINE HYDROCHLORIDE 20 MG/1
1 CAPSULE CAPSULE ORAL ONCE A DAY
Status: ACTIVE | COMMUNITY

## 2024-10-09 RX ORDER — NALTREXONE HYDROCHLORIDE 50 MG/1
1 TABLET TABLET, FILM COATED ORAL ONCE A DAY
Qty: 30 | Refills: 1 | Status: ACTIVE | COMMUNITY
Start: 2024-09-25 | End: 2024-11-23

## 2024-10-09 RX ORDER — MIDODRINE HYDROCHLORIDE 10 MG/1
1 TABLET TABLET ORAL TWICE A DAY
Status: ACTIVE | COMMUNITY

## 2024-10-09 RX ORDER — LACTULOSE 10 G/15ML
15 ML AS NEEDED SOLUTION ORAL TWICE A DAY
Qty: 900 ML | Refills: 3 | Status: ACTIVE | COMMUNITY

## 2024-10-09 RX ORDER — CARVEDILOL 3.12 MG/1
TAKE 1 TABLET BY MOUTH TWICE A DAY WITH FOOD FOR 30 DAYS TABLET, FILM COATED ORAL
Qty: 180 TABLET | Refills: 1 | Status: ACTIVE | COMMUNITY

## 2024-10-09 RX ORDER — POTASSIUM CHLORIDE 750 MG/1
1 TABLET WITH FOOD TABLET, EXTENDED RELEASE ORAL TWICE A DAY
Status: ACTIVE | COMMUNITY

## 2024-10-09 RX ORDER — SPIRONOLACTONE 100 MG/1
TAKE 1 TABLET BY MOUTH EVERY DAY FOR 30 DAYS TABLET ORAL
Qty: 90 TABLET | Refills: 1 | COMMUNITY

## 2024-10-09 RX ORDER — BUSPIRONE HYDROCHLORIDE 10 MG/1
1 TABLET TABLET ORAL TWICE A DAY
Status: ACTIVE | COMMUNITY

## 2024-10-09 RX ORDER — FUROSEMIDE 40 MG/1
TAKE 1 TABLET BY MOUTH EVERY DAY FOR 30 DAYS TABLET ORAL
Qty: 90 TABLET | Refills: 1 | Status: ACTIVE | COMMUNITY

## 2024-10-09 RX ORDER — GABAPENTIN 300 MG/1
1 CAPSULE CAPSULE ORAL ONCE A DAY
Status: ACTIVE | COMMUNITY

## 2024-10-28 ENCOUNTER — TELEPHONE ENCOUNTER (OUTPATIENT)
Dept: URBAN - METROPOLITAN AREA CLINIC 6 | Facility: CLINIC | Age: 34
End: 2024-10-28

## 2024-11-26 ENCOUNTER — TELEPHONE ENCOUNTER (OUTPATIENT)
Dept: URBAN - METROPOLITAN AREA CLINIC 82 | Facility: CLINIC | Age: 34
End: 2024-11-26

## 2024-11-26 ENCOUNTER — OFFICE VISIT (OUTPATIENT)
Dept: URBAN - METROPOLITAN AREA CLINIC 54 | Facility: CLINIC | Age: 34
End: 2024-11-26

## 2024-11-26 RX ORDER — FLUOXETINE HYDROCHLORIDE 20 MG/1
1 CAPSULE CAPSULE ORAL ONCE A DAY
Status: ACTIVE | COMMUNITY

## 2024-11-26 RX ORDER — CARVEDILOL 3.12 MG/1
TAKE 1 TABLET BY MOUTH TWICE A DAY WITH FOOD FOR 30 DAYS TABLET, FILM COATED ORAL
Qty: 180 TABLET | Refills: 1 | Status: ACTIVE | COMMUNITY

## 2024-11-26 RX ORDER — FUROSEMIDE 40 MG/1
TAKE 1 TABLET BY MOUTH EVERY DAY FOR 30 DAYS TABLET ORAL
Qty: 90 TABLET | Refills: 1 | Status: ACTIVE | COMMUNITY

## 2024-11-26 RX ORDER — GABAPENTIN 300 MG/1
1 CAPSULE CAPSULE ORAL ONCE A DAY
Status: ACTIVE | COMMUNITY

## 2024-11-26 RX ORDER — LACTULOSE 10 G/15ML
15 ML AS NEEDED SOLUTION ORAL TWICE A DAY
Qty: 900 ML | Refills: 3 | Status: ACTIVE | COMMUNITY

## 2024-11-26 RX ORDER — MIDODRINE HYDROCHLORIDE 10 MG/1
1 TABLET TABLET ORAL TWICE A DAY
Status: ACTIVE | COMMUNITY

## 2024-11-26 RX ORDER — SPIRONOLACTONE 100 MG/1
TAKE 1 TABLET BY MOUTH EVERY DAY FOR 30 DAYS TABLET ORAL
Qty: 90 TABLET | Refills: 1 | COMMUNITY

## 2024-11-26 RX ORDER — PANTOPRAZOLE SODIUM 20 MG/1
1 TABLET TABLET, DELAYED RELEASE ORAL ONCE A DAY
COMMUNITY

## 2024-11-26 RX ORDER — BUSPIRONE HYDROCHLORIDE 10 MG/1
1 TABLET TABLET ORAL TWICE A DAY
Status: ACTIVE | COMMUNITY

## 2024-11-26 RX ORDER — POTASSIUM CHLORIDE 750 MG/1
1 TABLET WITH FOOD TABLET, EXTENDED RELEASE ORAL TWICE A DAY
Status: ACTIVE | COMMUNITY

## 2024-12-02 ENCOUNTER — LAB OUTSIDE AN ENCOUNTER (OUTPATIENT)
Dept: URBAN - METROPOLITAN AREA CLINIC 82 | Facility: CLINIC | Age: 34
End: 2024-12-02

## 2024-12-09 ENCOUNTER — OFFICE VISIT (OUTPATIENT)
Dept: URBAN - METROPOLITAN AREA CLINIC 54 | Facility: CLINIC | Age: 34
End: 2024-12-09

## 2024-12-09 RX ORDER — SPIRONOLACTONE 100 MG/1
TAKE 1 TABLET BY MOUTH EVERY DAY FOR 30 DAYS TABLET ORAL
Qty: 90 TABLET | Refills: 1 | Status: ACTIVE | COMMUNITY

## 2024-12-09 RX ORDER — FUROSEMIDE 40 MG/1
TAKE 1 TABLET BY MOUTH EVERY DAY FOR 30 DAYS TABLET ORAL
Qty: 90 TABLET | Refills: 1 | Status: ACTIVE | COMMUNITY

## 2024-12-09 RX ORDER — PANTOPRAZOLE SODIUM 40 MG/1
1 TABLET TABLET, DELAYED RELEASE ORAL ONCE A DAY
Status: ACTIVE | COMMUNITY

## 2024-12-09 RX ORDER — PROPRANOLOL HYDROCHLORIDE 20 MG/1
1 TABLET TABLET ORAL TWICE A DAY
Status: ACTIVE | COMMUNITY

## 2024-12-09 RX ORDER — MIDODRINE HYDROCHLORIDE 10 MG/1
1 TABLET TABLET ORAL TWICE A DAY
Status: ACTIVE | COMMUNITY

## 2024-12-09 RX ORDER — GABAPENTIN 300 MG/1
1 CAPSULE CAPSULE ORAL ONCE A DAY
Status: ACTIVE | COMMUNITY

## 2024-12-09 RX ORDER — LACTULOSE 10 G/15ML
15 ML AS NEEDED SOLUTION ORAL TWICE A DAY
Qty: 900 ML | Refills: 3 | Status: ACTIVE | COMMUNITY

## 2024-12-09 RX ORDER — BUSPIRONE HYDROCHLORIDE 10 MG/1
1 TABLET TABLET ORAL TWICE A DAY
Status: ACTIVE | COMMUNITY

## 2024-12-09 RX ORDER — FLUOXETINE HYDROCHLORIDE 10 MG/1
1 CAPSULE CAPSULE ORAL ONCE A DAY
Status: ACTIVE | COMMUNITY

## 2024-12-13 ENCOUNTER — LAB OUTSIDE AN ENCOUNTER (OUTPATIENT)
Dept: URBAN - METROPOLITAN AREA CLINIC 54 | Facility: CLINIC | Age: 34
End: 2024-12-13

## 2024-12-13 LAB
ABSOLUTE BASOPHIL COUNT: 0.07
ABSOLUTE EOSINOPHIL COUNT: 0.44
ABSOLUTE IMMATURE GRANULOCYTE  COUNT: 0.02
ABSOLUTE LYMPHOCYTE COUNT: 0.97
ABSOLUTE MONOCYTE COUNT: 0.59
ABSOLUTE NEUTROPHIL COUNT (ANC): 4.37
ABSOLUTE NRBC  COUNT: 0
BASOPHIL AUTO: 1
EOS AUTO: 7
HCT: 28.1
HGB: 8.8
IMMATURE GRANULOCYTES AUTO: 0.3
INR: 1.5
LYMPH AUTO: 15
MCH: 23.4
MCHC: 31.3
MCV: 74.7
MONO AUTO: 9
MPV: 9.3
NEUTRO AUTO: 68
NRBC AUTO: 0
PARTIAL THROMBOPLASTIN TIME: 31.2
PERFORMING LAB: (no result)
PLATELETS: 121
PT: 18.2
RBC: 3.76
RDW: 21.2
WBC: 6.5

## 2025-04-10 ENCOUNTER — ERX REFILL RESPONSE (OUTPATIENT)
Dept: URBAN - METROPOLITAN AREA CLINIC 54 | Facility: CLINIC | Age: 35
End: 2025-04-10

## 2025-04-10 RX ORDER — FUROSEMIDE 40 MG/1
1 TABLET TABLET ORAL
Qty: 30 | Refills: 0

## 2025-05-30 ENCOUNTER — TELEPHONE ENCOUNTER (OUTPATIENT)
Dept: URBAN - METROPOLITAN AREA CLINIC 54 | Facility: CLINIC | Age: 35
End: 2025-05-30

## 2025-06-18 ENCOUNTER — OFFICE VISIT (OUTPATIENT)
Dept: URBAN - METROPOLITAN AREA CLINIC 54 | Facility: CLINIC | Age: 35
End: 2025-06-18

## 2025-06-20 ENCOUNTER — TELEPHONE ENCOUNTER (OUTPATIENT)
Dept: URBAN - METROPOLITAN AREA CLINIC 82 | Facility: CLINIC | Age: 35
End: 2025-06-20

## 2025-06-25 ENCOUNTER — OFFICE VISIT (OUTPATIENT)
Dept: URBAN - METROPOLITAN AREA CLINIC 54 | Facility: CLINIC | Age: 35
End: 2025-06-25

## 2025-06-25 ENCOUNTER — LAB OUTSIDE AN ENCOUNTER (OUTPATIENT)
Dept: URBAN - METROPOLITAN AREA CLINIC 54 | Facility: CLINIC | Age: 35
End: 2025-06-25

## 2025-06-25 ENCOUNTER — OFFICE VISIT (OUTPATIENT)
Dept: URBAN - METROPOLITAN AREA CLINIC 48 | Facility: CLINIC | Age: 35
End: 2025-06-25

## 2025-06-25 RX ORDER — FLUOXETINE HYDROCHLORIDE 10 MG/1
1 CAPSULE CAPSULE ORAL ONCE A DAY
Status: ACTIVE | COMMUNITY

## 2025-06-25 RX ORDER — LACTULOSE 10 G/15ML
15 ML AS NEEDED SOLUTION ORAL TWICE A DAY
Qty: 900 ML | Refills: 3 | Status: ACTIVE | COMMUNITY

## 2025-06-25 RX ORDER — SPIRONOLACTONE 100 MG/1
TAKE 1 TABLET BY MOUTH EVERY DAY FOR 30 DAYS TABLET ORAL
Qty: 90 TABLET | Refills: 1 | Status: ACTIVE | COMMUNITY

## 2025-06-25 RX ORDER — GABAPENTIN 300 MG/1
1 CAPSULE CAPSULE ORAL ONCE A DAY
Status: ACTIVE | COMMUNITY

## 2025-06-25 RX ORDER — BUSPIRONE HYDROCHLORIDE 10 MG/1
1 TABLET TABLET ORAL TWICE A DAY
Status: ACTIVE | COMMUNITY

## 2025-06-25 RX ORDER — FUROSEMIDE 40 MG/1
1 TABLET TABLET ORAL
Qty: 30 | Refills: 0 | Status: ACTIVE | COMMUNITY

## 2025-06-25 NOTE — HPI-TODAY'S VISIT:
Mayra Wolfe is a 33-year-old female with PMH of who was referred to clinic by JEROME Dickey for elevated LFTs.  A copy of this document will be sent to the referring provider.  Patient was noted to have elevated LFTs on 10/26/2023 when she was seen at urgent care and was referred to the ED.  She was kept in [facility] for call detox. LFTs at that time were , ALT 49, T. bili 1.90, alk phos 152.  Other labs included low folate.  RUQ US obtained on 12/20/2023 heterogeneous and coarsened liver parenchyma which may represent underlying chronic liver disease.  No intra or extrahepatic ductal dilation.  No evidence of acute cholecystitis. LFTs were not repeated during hospitalization or at follow-up.  Patient was previously drinking of a 750 mL bottle daily and has reportedly cut back to 10 oz daily and has been decreasing amount by 2 oz each week. Hepatitis panel was negative. No previous EGD or colonoscopy. Folate was recently noted to be low and on supplementation. Normal B12. She reports she was initially seen in the hospital for paresthesias. Follows with Dr Hobson who is considering GBS as a cause for neuropathy as there was a prodrome illness.   6/27/24 Follow Up: Pt returns for follow up with newly decompensated etoh cirrhosis. Pt was in [place] attending an EtOH rehab program starting on 6/10 when she developed menorrhagia, epistaxis, and gingival bleeding. Hospitalized at [facility] from 6/14 - 6/22 with acute blood loss anemia, DIC, and decomensated cirrhosis. Labs on admission: Hgb 6.9, MCV 98.1, , ALT 40, , TB 3.8, Alb 3.0, Cr 0.58, Na 133, Plt 54, INR 2.0 (MELD 3.0 of 23). Required 7 units PRBC, 5 units FFP, Vit K x3. S/p 2 paracenteses negative for SBP during this admission (8.5L on 6/17, 4.7L on 6/20). GI was consulted who did not recommend steroid or EGD. Started midodrine for hypotension, could not use diuretics. Pt returned on 6/23 - 6/24, recurrent ascites requiring another paracentesis (3.8L on 6/24). Has returned to [place] for ongoing medical mgmt.   Today pt complains of recurrent ascites causing discomfort and SOB. Still not on diuretics. Bleeding has resolved, denies any hematochezia, melena, or hematemesis. Admits to more confusion/forgetfulness than usual. Not on lactulose or xifaxan. Previous jaundice has resolved. Last drink was 6/10/24 and pt reports having no interest in drinking.  Labs 6/23/24: , ALT 25, , TB 2.3, Alb 3.1, Cr 0.55, Na 139, Plt 120, INR 1.4 (MELD 3.0 is 15)  CT abd/pelvis WO 6/23/24: - Moderate ascites abdomen and pelvis.  - Distended gallbladder without calculi or biliary ductal dilatation.  - Suspect cirrhosis with splenomegaly; no hepatic masses are seen.  Follow Up 7/8/24: Patient presents for post hospitazation follow up. She ended up at [facility] for UGIB. EGD revealed large varices with HRS s/p EBL x 6 and moderate PHG. Colon revealed rectal varices. She had LVP fo 2.8 L with no infection. She feels better. She c/o chest pain since the EGD. Her last drink of alcohol was 6/10/24. She is non-smoker and is living with her parents. She works at [employer] but currently short term disability.  6/25/25: Pt returns for follow up of EtOH cirrhosis. Last OV was almost a year ago. Recent hospitalization for....  Currently compensated/decompensated with ascites, LE edema, jaundice, coagulopathy, PSE or GI bleeding MELD 15 HCC screening EV surveillance done in Sept 2024 with Dr. Watts at [facility]. Small EVS, repeat in 2 years.  Meds: Lasix, Aldactone, Lactulose, Xifaxan OLT candidacy  Pt has been in and 2.5 weeks sober. Inpatient rehab in [facility] [place]. Jaundice over the weekend Having some trouble swallowing.  Here with mom and sister.   EGD 9/25/24: - Small (< 5 mm) esophageal varices. Band scars identified. - Z-line irregular, 40 cm from the incisors. - Portal hypertensive gastropathy.  Biopsied. - Normal examined duodenum. Mayra Wolfe is a 34-year-old female presenting for chronic liver disease management and lab monitoring. She is currently in a rehab facility and has been sober for two and a half weeks. Her main concern is to check if her liver enzymes have improved, as she needs this information for her ongoing rehabilitation. Mayra described persistent abdominal swelling, fluctuating water weight, and a sense of fullness that limits her appetite. She noted difficulty controlling sodium intake due to facility meals and continues to monitor her swelling and fluid intake. She reported taking Lasix and spironolactone daily but still experiences abdominal swelling and reduced urination. Mayra also described a recent episode of gastrointestinal bleeding, with both bright red and black tarry stools, though a recent rectal exam showed no active bleeding. She referenced a prior endoscopy that was normal but remains concerned about GI bleeding. Additionally, Mayra has experienced episodes of confusion and forgetfulness, which have recently improved. She continues to take lactulose and is aware of her elevated ammonia levels after drinking.

## 2025-06-26 LAB
A/G RATIO: 1.3
ABSOLUTE BASOPHILS: 52
ABSOLUTE EOSINOPHILS: 52
ABSOLUTE LYMPHOCYTES: 818
ABSOLUTE MONOCYTES: 343
ABSOLUTE NEUTROPHILS: 3436
AFP, SERUM, TUMOR MARKER: 5.4
ALBUMIN: 4.4
ALKALINE PHOSPHATASE: 87
ALT (SGPT): 20
AST (SGOT): 48
BASOPHILS: 1.1
BILIRUBIN, TOTAL: 1
BUN/CREATININE RATIO: (no result)
BUN: 9
CALCIUM: 9.6
CARBON DIOXIDE, TOTAL: 25
CHLORIDE: 99
CREATININE: 0.77
EGFR: 104
EOSINOPHILS: 1.1
GLOBULIN, TOTAL: 3.4
GLUCOSE: 63
HEMATOCRIT: 36.1
HEMOGLOBIN: 11.7
IMMUNOGLOBULIN A: 629
IMMUNOGLOBULIN G: 1540
IMMUNOGLOBULIN M: 331
INR: 1.4
LYMPHOCYTES: 17.4
MCH: 24.9
MCHC: 32.4
MCV: 76.8
MONOCYTES: 7.3
MPV: 9.9
NEUTROPHILS: 73.1
PLATELET COUNT: 94
POTASSIUM: 3.2
PROTEIN, TOTAL: 7.8
PT: 14.2
RDW: 20.3
RED BLOOD CELL COUNT: 4.7
SODIUM: 141
WHITE BLOOD CELL COUNT: 4.7

## 2025-06-27 ENCOUNTER — OFFICE VISIT (OUTPATIENT)
Dept: URBAN - METROPOLITAN AREA MEDICAL CENTER 24 | Facility: MEDICAL CENTER | Age: 35
End: 2025-06-27

## 2025-06-27 ENCOUNTER — OFFICE VISIT (OUTPATIENT)
Dept: URBAN - METROPOLITAN AREA CLINIC 54 | Facility: CLINIC | Age: 35
End: 2025-06-27

## 2025-06-27 RX ORDER — LACTULOSE 10 G/15ML
15 ML AS NEEDED SOLUTION ORAL TWICE A DAY
Qty: 900 ML | Refills: 3 | Status: ACTIVE | COMMUNITY

## 2025-06-27 RX ORDER — FUROSEMIDE 40 MG/1
1 TABLET TABLET ORAL
Qty: 30 | Refills: 0 | Status: ACTIVE | COMMUNITY

## 2025-06-27 RX ORDER — SPIRONOLACTONE 100 MG/1
TAKE 1 TABLET BY MOUTH EVERY DAY FOR 30 DAYS TABLET ORAL
Qty: 90 TABLET | Refills: 1 | Status: ACTIVE | COMMUNITY

## 2025-06-27 RX ORDER — MIDODRINE HYDROCHLORIDE 10 MG/1
1 TABLET TABLET ORAL TWICE A DAY
Status: ACTIVE | COMMUNITY

## 2025-06-27 RX ORDER — GABAPENTIN 300 MG/1
1 CAPSULE CAPSULE ORAL ONCE A DAY
Status: ACTIVE | COMMUNITY

## 2025-06-27 RX ORDER — FLUOXETINE HYDROCHLORIDE 10 MG/1
1 CAPSULE CAPSULE ORAL ONCE A DAY
Status: ACTIVE | COMMUNITY

## 2025-06-27 RX ORDER — PROPRANOLOL HYDROCHLORIDE 20 MG/1
1 TABLET TABLET ORAL TWICE A DAY
Status: ACTIVE | COMMUNITY

## 2025-06-27 RX ORDER — PANTOPRAZOLE SODIUM 40 MG/1
1 TABLET TABLET, DELAYED RELEASE ORAL ONCE A DAY
Status: ACTIVE | COMMUNITY

## 2025-06-27 RX ORDER — CARVEDILOL 3.12 MG/1
TAKE 1 TABLET BY MOUTH TWICE A DAY WITH FOOD FOR 30 DAYS TABLET, FILM COATED ORAL
Qty: 180 TABLET | Refills: 1 | Status: ACTIVE | COMMUNITY

## 2025-06-27 RX ORDER — BUSPIRONE HYDROCHLORIDE 10 MG/1
1 TABLET TABLET ORAL TWICE A DAY
Status: ACTIVE | COMMUNITY

## 2025-06-27 RX ORDER — POTASSIUM CHLORIDE 750 MG/1
1 TABLET WITH FOOD TABLET, EXTENDED RELEASE ORAL TWICE A DAY
Status: ACTIVE | COMMUNITY

## 2025-06-30 ENCOUNTER — OFFICE VISIT (OUTPATIENT)
Dept: URBAN - METROPOLITAN AREA CLINIC 84 | Facility: CLINIC | Age: 35
End: 2025-06-30

## 2025-06-30 RX ORDER — CLONIDINE HYDROCHLORIDE 0.1 MG/1
TAKE ONE TABLET BY MOUTH THREE TIMES DAILY AS NEEDED TABLET ORAL
Qty: 30 EACH | Refills: 0 | Status: ACTIVE | COMMUNITY

## 2025-06-30 RX ORDER — CLORAZEPATE DIPOTASSIUM 15 MG/1
TAKE AS DIRECTED PER SEVEN DAY TAPER TABLET ORAL
Qty: 20 EACH | Refills: 0 | Status: ACTIVE | COMMUNITY

## 2025-06-30 RX ORDER — BUSPIRONE HYDROCHLORIDE 15 MG/1
TABLET ORAL
Qty: 60 TABLET | Status: ACTIVE | COMMUNITY

## 2025-06-30 RX ORDER — POTASSIUM CHLORIDE 750 MG/1
1 TABLET WITH FOOD TABLET, EXTENDED RELEASE ORAL TWICE A DAY
Status: ON HOLD | COMMUNITY

## 2025-06-30 RX ORDER — CARVEDILOL 3.12 MG/1
TAKE 1 TABLET BY MOUTH TWICE A DAY WITH FOOD FOR 30 DAYS TABLET, FILM COATED ORAL
Qty: 180 TABLET | Refills: 1 | Status: ON HOLD | COMMUNITY

## 2025-06-30 RX ORDER — LACTULOSE 10 G/15ML
SOLUTION ORAL; RECTAL
Qty: 473 MILLILITER | Status: ACTIVE | COMMUNITY

## 2025-06-30 RX ORDER — OXCARBAZEPINE 150 MG/1
TABLET, FILM COATED ORAL
Qty: 60 TABLET | Status: ACTIVE | COMMUNITY

## 2025-06-30 RX ORDER — PROPRANOLOL HYDROCHLORIDE 20 MG/1
1 TABLET TABLET ORAL TWICE A DAY
Status: ON HOLD | COMMUNITY

## 2025-06-30 RX ORDER — LACTULOSE 10 G/15ML
15 ML AS NEEDED SOLUTION ORAL TWICE A DAY
Qty: 900 ML | Refills: 3 | Status: ON HOLD | COMMUNITY

## 2025-06-30 RX ORDER — BUPROPION HYDROCHLORIDE 150 MG/1
TABLET, EXTENDED RELEASE ORAL
Qty: 30 TABLET | Status: ACTIVE | COMMUNITY

## 2025-06-30 RX ORDER — HYDROXYZINE PAMOATE 50 MG/1
CAPSULE ORAL
Qty: 30 CAPSULE | Status: ACTIVE | COMMUNITY

## 2025-06-30 RX ORDER — TRAZODONE HYDROCHLORIDE 50 MG/1
TAKE ONE TABLET BY MOUTH AT BEDTIME TABLET ORAL
Qty: 30 EACH | Refills: 0 | Status: ACTIVE | COMMUNITY

## 2025-06-30 RX ORDER — FLUOXETINE HYDROCHLORIDE 40 MG/1
CAPSULE ORAL
Qty: 30 CAPSULE | Status: ACTIVE | COMMUNITY

## 2025-06-30 RX ORDER — SPIRONOLACTONE 25 MG/1
TABLET ORAL
Qty: 30 TABLET | Status: ACTIVE | COMMUNITY

## 2025-06-30 RX ORDER — QUETIAPINE FUMARATE 25 MG/1
TABLET, FILM COATED ORAL
Qty: 90 TABLET | Status: ACTIVE | COMMUNITY

## 2025-06-30 RX ORDER — PANTOPRAZOLE SODIUM 40 MG/1
1 TABLET TABLET, DELAYED RELEASE ORAL ONCE A DAY
Status: ON HOLD | COMMUNITY

## 2025-06-30 RX ORDER — MIDODRINE HYDROCHLORIDE 10 MG/1
1 TABLET TABLET ORAL TWICE A DAY
Status: ON HOLD | COMMUNITY

## 2025-06-30 RX ORDER — FUROSEMIDE 40 MG/1
TABLET ORAL
Qty: 30 TABLET | Status: ACTIVE | COMMUNITY

## 2025-06-30 RX ORDER — PHENOBARBITAL 16.2 MG/1
TAKE ONE TABLET BY MOUTH THREE TIMES DAILY TABLET ORAL
Qty: 21 EACH | Refills: 0 | Status: ACTIVE | COMMUNITY

## 2025-06-30 RX ORDER — ONDANSETRON HYDROCHLORIDE 4 MG/1
TABLET, FILM COATED ORAL
Qty: 12 TABLET | Status: ACTIVE | COMMUNITY

## 2025-07-03 ENCOUNTER — TELEPHONE ENCOUNTER (OUTPATIENT)
Dept: URBAN - METROPOLITAN AREA CLINIC 54 | Facility: CLINIC | Age: 35
End: 2025-07-03

## 2025-07-08 ENCOUNTER — LAB OUTSIDE AN ENCOUNTER (OUTPATIENT)
Dept: URBAN - METROPOLITAN AREA CLINIC 54 | Facility: CLINIC | Age: 35
End: 2025-07-08

## 2025-08-22 ENCOUNTER — TELEPHONE ENCOUNTER (OUTPATIENT)
Dept: URBAN - METROPOLITAN AREA CLINIC 54 | Facility: CLINIC | Age: 35
End: 2025-08-22

## 2025-08-22 RX ORDER — SPIRONOLACTONE 100 MG/1
TAKE 1 TABLET BY MOUTH EVERY DAY FOR 30 DAYS TABLET ORAL
Qty: 90 TABLET | Refills: 1